# Patient Record
Sex: MALE | Race: BLACK OR AFRICAN AMERICAN | NOT HISPANIC OR LATINO | URBAN - METROPOLITAN AREA
[De-identification: names, ages, dates, MRNs, and addresses within clinical notes are randomized per-mention and may not be internally consistent; named-entity substitution may affect disease eponyms.]

---

## 2018-02-14 NOTE — PATIENT DISCUSSION
GLAUCOMA SUSPECT, OU : INTRAOCULAR PRESSURE AND OCT  IS WITHIN ACCEPTABLE LIMITS. NO DROP THERAPY NEEDED AT THIS TIME. PATIENT WILL HAVE IOP CK WITH BRAME.

## 2020-08-10 ENCOUNTER — IMPORTED ENCOUNTER (OUTPATIENT)
Dept: URBAN - METROPOLITAN AREA CLINIC 9 | Facility: CLINIC | Age: 80
End: 2020-08-10

## 2020-08-24 ENCOUNTER — IMPORTED ENCOUNTER (OUTPATIENT)
Dept: URBAN - METROPOLITAN AREA CLINIC 9 | Facility: CLINIC | Age: 80
End: 2020-08-24

## 2020-09-09 ENCOUNTER — IMPORTED ENCOUNTER (OUTPATIENT)
Dept: URBAN - METROPOLITAN AREA CLINIC 9 | Facility: CLINIC | Age: 80
End: 2020-09-09

## 2020-09-23 ENCOUNTER — IMPORTED ENCOUNTER (OUTPATIENT)
Dept: URBAN - METROPOLITAN AREA CLINIC 9 | Facility: CLINIC | Age: 80
End: 2020-09-23

## 2020-10-14 ENCOUNTER — IMPORTED ENCOUNTER (OUTPATIENT)
Dept: URBAN - METROPOLITAN AREA CLINIC 9 | Facility: CLINIC | Age: 80
End: 2020-10-14

## 2020-10-26 ENCOUNTER — IMPORTED ENCOUNTER (OUTPATIENT)
Dept: URBAN - METROPOLITAN AREA CLINIC 9 | Facility: CLINIC | Age: 80
End: 2020-10-26

## 2021-06-14 NOTE — PATIENT DISCUSSION
GLAUCOMA SUSPECT, OU : ENLARGED OPTIC NERVE CUPPING.  RETURN FOR FOLLOW UP AS SCHEDULED
General:
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 RETURN FOR FOLLOW-UP AS SCHEDULED.
yes...

## 2021-10-11 ENCOUNTER — ESTABLISHED PATIENT (OUTPATIENT)
Dept: URBAN - METROPOLITAN AREA CLINIC 4 | Facility: CLINIC | Age: 81
End: 2021-10-11

## 2021-10-11 DIAGNOSIS — H26.493: ICD-10-CM

## 2021-10-11 PROCEDURE — 92015 DETERMINE REFRACTIVE STATE: CPT

## 2021-10-11 PROCEDURE — 92014 COMPRE OPH EXAM EST PT 1/>: CPT

## 2021-10-11 ASSESSMENT — KERATOMETRY
OD_K2POWER_DIOPTERS: 44.50
OS_K1POWER_DIOPTERS: 44.00
OD_AXISANGLE_DEGREES: 126
OS_AXISANGLE2_DEGREES: 88
OD_AXISANGLE2_DEGREES: 36
OD_K1POWER_DIOPTERS: 44.00
OS_K2POWER_DIOPTERS: 46.75
OS_AXISANGLE_DEGREES: 178

## 2021-10-11 ASSESSMENT — VISUAL ACUITY
OD_SC: 20/50-1
OD_PH: 20/30-1
OS_GLARE: 20/80
OS_SC: 20/80+1
OS_PH: 20/40-1
OD_GLARE: 20/50

## 2021-10-11 ASSESSMENT — TONOMETRY
OS_IOP_MMHG: 15
OD_IOP_MMHG: 17

## 2021-10-16 ASSESSMENT — KERATOMETRY
OS_AXISANGLE_DEGREES: 158
OD_K2POWER_DIOPTERS: 44.5
OD_K1POWER_DIOPTERS: 44
OS_K1POWER_DIOPTERS: 44
OS_AXISANGLE2_DEGREES: 68
OD_AXISANGLE2_DEGREES: 48
OS_K2POWER_DIOPTERS: 46.25
OD_AXISANGLE_DEGREES: 138

## 2021-10-16 ASSESSMENT — VISUAL ACUITY
OD_SC: 20/70 + SN
OD_CC: 20/40 -2 SN
OS_SC: 20/70 - SN
OD_SC: 20/100 - SN
OS_SC: 20/70 - SN
OS_CC: 20/40 SN
OD_CC: 20/40 SN
OD_CC: 20/40 + SN
OS_CC: 20/40 - SN
OS_CC: 20/40 -2 SN
OD_CC: 20/30 - SN

## 2021-10-16 ASSESSMENT — TONOMETRY
OD_IOP_MMHG: 14
OD_IOP_MMHG: 23
OS_IOP_MMHG: 22
OS_IOP_MMHG: 17
OS_IOP_MMHG: 16
OS_IOP_MMHG: 14
OD_IOP_MMHG: 17

## 2022-01-05 NOTE — PATIENT DISCUSSION
VISUALLY SIGNIFICANT. OPTION OF SURGERY VERSUS FOLLOWING VERSUS UPDATING GLASSES DISCUSSED.  RBA'S DISCUSSED, PATIENT UNDERSTANDS AND DESIRES SURGERY TO INCREASE VISION FOR DRIVING AND GLARE FROM LIGHTS.  SCHEDULE CATARACT SURGERY/PRE-OP OU.

## 2022-10-17 ENCOUNTER — ESTABLISHED PATIENT (OUTPATIENT)
Dept: URBAN - METROPOLITAN AREA CLINIC 4 | Facility: CLINIC | Age: 82
End: 2022-10-17

## 2022-10-17 DIAGNOSIS — H10.45: ICD-10-CM

## 2022-10-17 DIAGNOSIS — H26.493: ICD-10-CM

## 2022-10-17 PROCEDURE — 92015 DETERMINE REFRACTIVE STATE: CPT

## 2022-10-17 PROCEDURE — 92014 COMPRE OPH EXAM EST PT 1/>: CPT

## 2022-10-17 ASSESSMENT — VISUAL ACUITY
OD_GLARE: 20/50
OU_CC: 20/50
OD_CC: 20/60
OS_GLARE: 20/80
OS_CC: 20/50

## 2022-10-17 ASSESSMENT — KERATOMETRY
OS_K2POWER_DIOPTERS: 46.75
OS_AXISANGLE2_DEGREES: 88
OS_K1POWER_DIOPTERS: 44.00
OS_AXISANGLE_DEGREES: 178
OD_K2POWER_DIOPTERS: 44.50
OD_K1POWER_DIOPTERS: 44.00
OD_AXISANGLE2_DEGREES: 36
OD_AXISANGLE_DEGREES: 126

## 2022-10-17 ASSESSMENT — TONOMETRY
OD_IOP_MMHG: 16
OS_IOP_MMHG: 17

## 2023-11-02 ENCOUNTER — ESTABLISHED PATIENT (OUTPATIENT)
Facility: LOCATION | Age: 83
End: 2023-11-02

## 2023-11-02 DIAGNOSIS — H26.493: ICD-10-CM

## 2023-11-02 DIAGNOSIS — H10.45: ICD-10-CM

## 2023-11-02 PROCEDURE — 92014 COMPRE OPH EXAM EST PT 1/>: CPT

## 2023-11-02 ASSESSMENT — KERATOMETRY
OD_K1POWER_DIOPTERS: 44.25
OD_AXISANGLE2_DEGREES: 39
OS_AXISANGLE2_DEGREES: 87
OD_AXISANGLE_DEGREES: 129
OS_AXISANGLE_DEGREES: 177
OS_K2POWER_DIOPTERS: 45.25
OD_K2POWER_DIOPTERS: 45.00
OS_K1POWER_DIOPTERS: 43.50

## 2023-11-02 ASSESSMENT — VISUAL ACUITY
OD_CC: 20/50
OS_CC: 20/40
OS_GLARE: 20/80
OD_GLARE: 20/80
OU_CC: 20/40

## 2023-11-02 ASSESSMENT — TONOMETRY
OS_IOP_MMHG: 12
OD_IOP_MMHG: 12

## 2024-11-25 NOTE — PATIENT DISCUSSION
Lens Material: Detail Level: Detailed Photo Preface (Leave Blank If You Do Not Want): Photographs were obtained today

## 2025-03-13 ENCOUNTER — COMPREHENSIVE EXAM (OUTPATIENT)
Age: 85
End: 2025-03-13

## 2025-03-13 DIAGNOSIS — H26.493: ICD-10-CM

## 2025-03-13 DIAGNOSIS — H10.45: ICD-10-CM

## 2025-03-13 PROCEDURE — 92014 COMPRE OPH EXAM EST PT 1/>: CPT

## 2025-03-13 PROCEDURE — 92015 DETERMINE REFRACTIVE STATE: CPT
